# Patient Record
Sex: MALE | Race: WHITE | URBAN - METROPOLITAN AREA
[De-identification: names, ages, dates, MRNs, and addresses within clinical notes are randomized per-mention and may not be internally consistent; named-entity substitution may affect disease eponyms.]

---

## 2022-10-18 ENCOUNTER — OFFICE VISIT (OUTPATIENT)
Dept: URBAN - METROPOLITAN AREA CLINIC 71 | Facility: CLINIC | Age: 37
End: 2022-10-18
Payer: COMMERCIAL

## 2022-10-18 DIAGNOSIS — H52.13 MYOPIA, BILATERAL: ICD-10-CM

## 2022-10-18 PROCEDURE — 99204 OFFICE O/P NEW MOD 45 MIN: CPT

## 2022-10-18 PROCEDURE — 92015 DETERMINE REFRACTIVE STATE: CPT

## 2022-10-18 RX ORDER — CIPROFLOXACIN HYDROCHLORIDE 3 MG/ML
0.3 % SOLUTION/ DROPS OPHTHALMIC
Qty: 5 | Refills: 0 | Status: INACTIVE
Start: 2022-10-18 | End: 2022-10-19

## 2022-10-18 ASSESSMENT — VISUAL ACUITY
OS: 20/100
OD: 20/20

## 2022-10-18 ASSESSMENT — INTRAOCULAR PRESSURE
OD: 16
OS: 9

## 2022-10-18 NOTE — IMPRESSION/PLAN
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 Plan: Discussed and stressed severity of the corneal ulcer and advised patient to d/c all contact lens usage in the OS. Recommend patient see corneal specialist today. Will have patient continue using Tobramycin every 30 minutes and start Cipro every 30 minutes.

## 2022-10-18 NOTE — IMPRESSION/PLAN
Impression: Myopia, bilateral: H52.13. Patient has no glasses prescription at this time. He is wanting to get glasses now. Due to no glasses, patient had to insert OD contact lens again. Thoroughly expressed that contacts should no longer be worn after patient gets home today. Plan: Discussed. Giving patient updated glasses Rx today and again stressed completely d/c wearing CTLs at this time. Explained patient has 90 days to RTC to change Rx if there is any issues with new Rx. Patient voices understanding and dispensed glasses Rx today.

## 2022-10-19 ENCOUNTER — OFFICE VISIT (OUTPATIENT)
Dept: URBAN - METROPOLITAN AREA CLINIC 10 | Facility: CLINIC | Age: 37
End: 2022-10-19
Payer: COMMERCIAL

## 2022-10-19 DIAGNOSIS — H16.002 CORNEAL ULCER OF LEFT EYE: Primary | ICD-10-CM

## 2022-10-19 PROCEDURE — 92004 COMPRE OPH EXAM NEW PT 1/>: CPT | Performed by: OPHTHALMOLOGY

## 2022-10-19 RX ORDER — PREDNISOLONE ACETATE 10 MG/ML
1 % SUSPENSION/ DROPS OPHTHALMIC
Qty: 5 | Refills: 2 | Status: INACTIVE
Start: 2022-10-19 | End: 2022-10-19

## 2022-10-19 RX ORDER — GANCICLOVIR 1.5 MG/G
0.15 % GEL OPHTHALMIC
Qty: 3.5 | Refills: 3 | Status: ACTIVE
Start: 2022-10-19

## 2022-10-19 RX ORDER — ACYCLOVIR 400 MG/1
400 MG TABLET ORAL
Qty: 150 | Refills: 3 | Status: ACTIVE
Start: 2022-10-19

## 2022-10-19 NOTE — IMPRESSION/PLAN
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 Very faint central branching lesion seen, however may be healing epi. Given history, will start zirgan and ACV 400mg 5x/d Decrease tobra and cipro to 6x/d Will start pred nv

## 2022-10-20 NOTE — IMPRESSION/PLAN
Impression: Myopia, bilateral: H52.13. Patient has no glasses prescription at this time. He is wanting to get glasses now. Due to no glasses, patient had to insert OD contact lens again. Thoroughly expressed that contacts should no longer be worn after patient gets home today.  Plan: Pt still wearing CTL OD, again instructed pt to d/c all CTL wear

## 2022-10-28 ENCOUNTER — OFFICE VISIT (OUTPATIENT)
Dept: URBAN - METROPOLITAN AREA CLINIC 24 | Facility: CLINIC | Age: 37
End: 2022-10-28
Payer: COMMERCIAL

## 2022-10-28 DIAGNOSIS — H16.002 CORNEAL ULCER OF LEFT EYE: Primary | ICD-10-CM

## 2022-10-28 PROCEDURE — 92012 INTRM OPH EXAM EST PATIENT: CPT | Performed by: OPHTHALMOLOGY

## 2022-10-28 NOTE — IMPRESSION/PLAN
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 Plan: Stable. Decrease tobra and cipro to 4x/d Cont  zirgan 5x/d, samples of zirgan given Cont ACV 400mg 5x/d Start lopred BID

## 2022-11-14 ENCOUNTER — OFFICE VISIT (OUTPATIENT)
Dept: URBAN - METROPOLITAN AREA CLINIC 44 | Facility: CLINIC | Age: 37
End: 2022-11-14
Payer: MEDICAID

## 2022-11-14 DIAGNOSIS — H16.002 CORNEAL ULCER OF LEFT EYE: Primary | ICD-10-CM

## 2022-11-14 PROCEDURE — 92012 INTRM OPH EXAM EST PATIENT: CPT | Performed by: OPHTHALMOLOGY

## 2022-11-14 RX ORDER — LOTEPREDNOL ETABONATE 10 MG/ML
1 % SUSPENSION TOPICAL
Qty: 5 | Refills: 0 | Status: INACTIVE
Start: 2022-11-14 | End: 2022-11-27

## 2022-11-14 NOTE — IMPRESSION/PLAN
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 Plan: Nearly resolved Cont cipro  4x/d x 1 week and then d/c.
d/c  tobra Finish zirgan samples TID until gone Decrease ACV 400mg to 2x/d Cont lopred BID x 1 week then d/c. Given improvement, may return to [facility] for monitoring/management, cornea prn problems.